# Patient Record
Sex: FEMALE | Race: WHITE | NOT HISPANIC OR LATINO | Employment: FULL TIME | URBAN - METROPOLITAN AREA
[De-identification: names, ages, dates, MRNs, and addresses within clinical notes are randomized per-mention and may not be internally consistent; named-entity substitution may affect disease eponyms.]

---

## 2023-11-01 ENCOUNTER — APPOINTMENT (OUTPATIENT)
Dept: PRIMARY CARE | Facility: CLINIC | Age: 48
End: 2023-11-01
Payer: COMMERCIAL

## 2023-11-06 RX ORDER — ATORVASTATIN CALCIUM 20 MG/1
20 TABLET, FILM COATED ORAL EVERY 24 HOURS
COMMUNITY
End: 2023-11-29 | Stop reason: SDUPTHER

## 2023-11-06 RX ORDER — CLOBETASOL PROPIONATE 0.5 MG/G
1 OINTMENT TOPICAL EVERY 12 HOURS
COMMUNITY
Start: 2022-10-26 | End: 2024-02-13

## 2023-11-06 RX ORDER — FLUOCINOLONE ACETONIDE 0.11 MG/ML
5 OIL AURICULAR (OTIC) EVERY 12 HOURS
COMMUNITY
End: 2023-11-14 | Stop reason: ALTCHOICE

## 2023-11-06 RX ORDER — ALPRAZOLAM 0.25 MG/1
0.25 TABLET ORAL 2 TIMES DAILY
COMMUNITY
Start: 2015-11-23 | End: 2024-05-20 | Stop reason: SDUPTHER

## 2023-11-06 RX ORDER — FLUTICASONE PROPIONATE 50 MCG
1 SPRAY, SUSPENSION (ML) NASAL EVERY 24 HOURS
COMMUNITY
Start: 2022-11-02 | End: 2023-11-14 | Stop reason: ALTCHOICE

## 2023-11-14 ENCOUNTER — OFFICE VISIT (OUTPATIENT)
Dept: PRIMARY CARE | Facility: CLINIC | Age: 48
End: 2023-11-14
Payer: COMMERCIAL

## 2023-11-14 ENCOUNTER — APPOINTMENT (OUTPATIENT)
Dept: PRIMARY CARE | Facility: CLINIC | Age: 48
End: 2023-11-14
Payer: COMMERCIAL

## 2023-11-14 VITALS
HEART RATE: 86 BPM | DIASTOLIC BLOOD PRESSURE: 64 MMHG | OXYGEN SATURATION: 94 % | SYSTOLIC BLOOD PRESSURE: 102 MMHG | BODY MASS INDEX: 36.99 KG/M2 | WEIGHT: 199 LBS

## 2023-11-14 DIAGNOSIS — B37.31 VAGINAL YEAST INFECTION: Primary | ICD-10-CM

## 2023-11-14 DIAGNOSIS — R73.9 HYPERGLYCEMIA: ICD-10-CM

## 2023-11-14 DIAGNOSIS — E78.2 MIXED HYPERLIPIDEMIA: ICD-10-CM

## 2023-11-14 PROBLEM — E78.5 DYSLIPIDEMIA: Status: ACTIVE | Noted: 2023-11-14

## 2023-11-14 PROBLEM — E66.9 OBESITY: Status: ACTIVE | Noted: 2023-11-14

## 2023-11-14 PROBLEM — E78.5 HYPERLIPIDEMIA: Status: ACTIVE | Noted: 2023-11-14

## 2023-11-14 PROBLEM — F17.200 TOBACCO USE DISORDER: Status: ACTIVE | Noted: 2023-11-14

## 2023-11-14 PROBLEM — R51.9 HEADACHE, CHRONIC DAILY: Status: ACTIVE | Noted: 2023-11-14

## 2023-11-14 PROBLEM — L40.9 PSORIASIS: Status: ACTIVE | Noted: 2023-11-14

## 2023-11-14 PROBLEM — R87.810 CERVICAL HIGH RISK HPV (HUMAN PAPILLOMAVIRUS) TEST POSITIVE: Status: ACTIVE | Noted: 2023-11-14

## 2023-11-14 PROBLEM — Z78.9 MEDICAL HOME PATIENT: Status: ACTIVE | Noted: 2023-11-14

## 2023-11-14 PROBLEM — M26.609 TEMPOROMANDIBULAR JOINT DISORDERS: Status: ACTIVE | Noted: 2023-11-14

## 2023-11-14 PROBLEM — G47.33 SLEEP APNEA, OBSTRUCTIVE: Status: ACTIVE | Noted: 2023-11-14

## 2023-11-14 PROBLEM — E55.9 VITAMIN D DEFICIENCY: Status: ACTIVE | Noted: 2023-11-14

## 2023-11-14 PROBLEM — F41.9 ANXIETY: Status: ACTIVE | Noted: 2023-11-14

## 2023-11-14 PROBLEM — F17.210 CIGARETTE SMOKER: Status: ACTIVE | Noted: 2023-11-14

## 2023-11-14 PROBLEM — K21.9 CHRONIC GERD: Status: ACTIVE | Noted: 2023-11-14

## 2023-11-14 PROBLEM — F43.21 GRIEF: Status: ACTIVE | Noted: 2023-11-14

## 2023-11-14 PROBLEM — E78.5 DYSLIPIDEMIA: Status: RESOLVED | Noted: 2023-11-14 | Resolved: 2023-11-14

## 2023-11-14 PROBLEM — M48.00 SPINAL STENOSIS: Status: ACTIVE | Noted: 2023-11-14

## 2023-11-14 PROCEDURE — 4004F PT TOBACCO SCREEN RCVD TLK: CPT | Performed by: INTERNAL MEDICINE

## 2023-11-14 PROCEDURE — 99214 OFFICE O/P EST MOD 30 MIN: CPT | Performed by: INTERNAL MEDICINE

## 2023-11-14 RX ORDER — TOPIRAMATE 50 MG/1
50 TABLET, FILM COATED ORAL NIGHTLY
COMMUNITY
Start: 2015-11-23 | End: 2023-11-14 | Stop reason: ALTCHOICE

## 2023-11-14 RX ORDER — FLUOCINOLONE ACETONIDE 0.11 MG/ML
OIL TOPICAL
COMMUNITY
Start: 2023-09-29 | End: 2024-05-20 | Stop reason: ALTCHOICE

## 2023-11-14 RX ORDER — FLUCONAZOLE 200 MG/1
200 TABLET ORAL DAILY
Qty: 3 TABLET | Refills: 0 | Status: SHIPPED | OUTPATIENT
Start: 2023-11-14 | End: 2023-11-17

## 2023-11-14 RX ORDER — GUSELKUMAB 100 MG/ML
100 INJECTION SUBCUTANEOUS
COMMUNITY
Start: 2023-08-29 | End: 2023-11-14 | Stop reason: ALTCHOICE

## 2023-11-14 ASSESSMENT — PAIN SCALES - GENERAL: PAINLEVEL: 0-NO PAIN

## 2023-11-14 ASSESSMENT — ENCOUNTER SYMPTOMS
DEPRESSION: 0
LOSS OF SENSATION IN FEET: 0
OCCASIONAL FEELINGS OF UNSTEADINESS: 0

## 2023-11-14 ASSESSMENT — PATIENT HEALTH QUESTIONNAIRE - PHQ9
2. FEELING DOWN, DEPRESSED OR HOPELESS: NOT AT ALL
1. LITTLE INTEREST OR PLEASURE IN DOING THINGS: NOT AT ALL
SUM OF ALL RESPONSES TO PHQ9 QUESTIONS 1 AND 2: 0

## 2023-11-14 NOTE — PROGRESS NOTES
Subjective   Patient ID: Svetlana Mercedes is a 47 y.o. female who presents for VAGINAL ISSUE / LAB ORDERS.    About a week red and irritated down there--not the first time it has happened.  Painful, no drainage, not aware of any lesions. Had been on 90 days of tremfia--off now. No h/o STD's.  Sexually active only w/ -he has no issues.  Only minor hot flashes currently--no menses due to ablation in 1999.           Objective   /64 (BP Location: Left arm, Patient Position: Sitting)   Pulse 86   Wt 90.3 kg (199 lb)   SpO2 94%   BMI 36.99 kg/m²     Physical Exam  Genitourinary:     Pubic Area: No rash.       Labia:         Right: No rash or lesion.         Left: No rash or lesion.       Comments: Vaginal walls red w/ adherant white plagues, so separate lesions  Lymphadenopathy:      Lower Body: No right inguinal adenopathy. No left inguinal adenopathy.         Assessment/Plan        Svetlana was seen today for vaginal issue / lab orders.  Diagnoses and all orders for this visit:  Vaginal yeast infection (Primary)  -     fluconazole (Diflucan) 200 mg tablet; Take 1 tablet (200 mg) by mouth once daily for 3 days.  Mixed hyperlipidemia  -     Comprehensive Metabolic Panel; Future  -     Lipid Panel; Future  Hyperglycemia  -     Hemoglobin A1C; Future

## 2023-11-20 ENCOUNTER — OFFICE VISIT (OUTPATIENT)
Dept: PRIMARY CARE | Facility: CLINIC | Age: 48
End: 2023-11-20
Payer: COMMERCIAL

## 2023-11-20 ENCOUNTER — APPOINTMENT (OUTPATIENT)
Dept: PRIMARY CARE | Facility: CLINIC | Age: 48
End: 2023-11-20
Payer: COMMERCIAL

## 2023-11-20 VITALS
SYSTOLIC BLOOD PRESSURE: 112 MMHG | DIASTOLIC BLOOD PRESSURE: 60 MMHG | HEART RATE: 72 BPM | OXYGEN SATURATION: 94 % | BODY MASS INDEX: 37.21 KG/M2 | WEIGHT: 200.2 LBS

## 2023-11-20 DIAGNOSIS — R05.2 SUBACUTE COUGH: ICD-10-CM

## 2023-11-20 DIAGNOSIS — H65.02 NON-RECURRENT ACUTE SEROUS OTITIS MEDIA OF LEFT EAR: Primary | ICD-10-CM

## 2023-11-20 PROBLEM — F17.210 CIGARETTE SMOKER: Status: RESOLVED | Noted: 2023-11-14 | Resolved: 2023-11-20

## 2023-11-20 PROBLEM — Z78.9 MEDICAL HOME PATIENT: Status: RESOLVED | Noted: 2023-11-14 | Resolved: 2023-11-20

## 2023-11-20 PROCEDURE — 4004F PT TOBACCO SCREEN RCVD TLK: CPT | Performed by: INTERNAL MEDICINE

## 2023-11-20 PROCEDURE — 99214 OFFICE O/P EST MOD 30 MIN: CPT | Performed by: INTERNAL MEDICINE

## 2023-11-20 RX ORDER — DOXYCYCLINE 100 MG/1
100 CAPSULE ORAL 2 TIMES DAILY
Qty: 14 CAPSULE | Refills: 0 | Status: SHIPPED | OUTPATIENT
Start: 2023-11-20 | End: 2023-11-29 | Stop reason: ALTCHOICE

## 2023-11-20 RX ORDER — BENZONATATE 200 MG/1
200 CAPSULE ORAL 3 TIMES DAILY PRN
Qty: 42 CAPSULE | Refills: 0 | Status: SHIPPED | OUTPATIENT
Start: 2023-11-20 | End: 2023-11-29 | Stop reason: ALTCHOICE

## 2023-11-20 RX ORDER — BUDESONIDE AND FORMOTEROL FUMARATE DIHYDRATE 160; 4.5 UG/1; UG/1
2 AEROSOL RESPIRATORY (INHALATION)
Qty: 1 EACH | Refills: 0 | Status: SHIPPED | OUTPATIENT
Start: 2023-11-20 | End: 2023-11-29 | Stop reason: ALTCHOICE

## 2023-11-20 ASSESSMENT — ENCOUNTER SYMPTOMS
SINUS PAIN: 1
COUGH: 1
WHEEZING: 0
HEADACHES: 0
RHINORRHEA: 1
DEPRESSION: 0
SWOLLEN GLANDS: 1
SORE THROAT: 1
LOSS OF SENSATION IN FEET: 0
OCCASIONAL FEELINGS OF UNSTEADINESS: 0

## 2023-11-20 ASSESSMENT — PAIN SCALES - GENERAL: PAINLEVEL: 0-NO PAIN

## 2023-11-20 NOTE — PROGRESS NOTES
Subjective   Patient ID: Svetlana Mercedes is a 47 y.o. female who presents for Nasal Congestion.    Tobacco use--continues to smoke     No chance of pregnancy    URI   This is a new (neg covid test at home) problem. The current episode started in the past 7 days. The problem has been unchanged. There has been no fever. Associated symptoms include congestion, coughing (present for a few weeks), ear pain, rhinorrhea, sinus pain, a sore throat and swollen glands. Pertinent negatives include no chest pain, headaches or wheezing. Associated symptoms comments: Denies shortness of breath or wheezing. She has tried acetaminophen and decongestant for the symptoms. The treatment provided mild relief.        Review of Systems   HENT:  Positive for congestion, ear pain, rhinorrhea, sinus pain and sore throat.    Respiratory:  Positive for cough (present for a few weeks). Negative for wheezing.    Cardiovascular:  Negative for chest pain.   Neurological:  Negative for headaches.       Objective   /60 (BP Location: Left arm, Patient Position: Sitting)   Pulse 72   Wt 90.8 kg (200 lb 3.2 oz)   SpO2 94%   BMI 37.21 kg/m²     Physical Exam  Constitutional:       General: She is not in acute distress.     Appearance: She is ill-appearing.   HENT:      Ears:      Comments: Bilat TM w/ fluid L red and bulgint     Nose: Congestion and rhinorrhea present.      Mouth/Throat:      Comments: PND  Cardiovascular:      Rate and Rhythm: Normal rate and regular rhythm.   Pulmonary:      Effort: No respiratory distress.      Breath sounds: Decreased air movement present. Wheezing (faint with forced expiration) and rhonchi present. No decreased breath sounds.   Lymphadenopathy:      Cervical: Cervical adenopathy present.         Assessment/Plan  advised to quit smoking and go to ER if shortness of breath worsens      Svetlana was seen today for nasal congestion.  Diagnoses and all orders for this visit:  Non-recurrent acute serous otitis  media of left ear (Primary)  -     doxycycline (Vibramycin) 100 mg capsule; Take 1 capsule (100 mg) by mouth 2 times a day for 10 days. Take with at least 8 ounces (large glass) of water, do not lie down for 30 minutes after  Subacute cough  -     benzonatate (Tessalon) 200 mg capsule; Take 1 capsule (200 mg) by mouth 3 times a day as needed for cough. Do not crush or chew.  -     budesonide-formoteroL (Symbicort) 160-4.5 mcg/actuation inhaler; Inhale 2 puffs 2 times a day. Rinse mouth with water after use to reduce aftertaste and incidence of candidiasis. Do not swallow.

## 2023-11-21 ENCOUNTER — APPOINTMENT (OUTPATIENT)
Dept: PRIMARY CARE | Facility: CLINIC | Age: 48
End: 2023-11-21
Payer: COMMERCIAL

## 2023-11-25 ENCOUNTER — LAB (OUTPATIENT)
Dept: LAB | Facility: LAB | Age: 48
End: 2023-11-25
Payer: COMMERCIAL

## 2023-11-25 DIAGNOSIS — R73.9 HYPERGLYCEMIA: ICD-10-CM

## 2023-11-25 DIAGNOSIS — E78.2 MIXED HYPERLIPIDEMIA: ICD-10-CM

## 2023-11-25 LAB
ALBUMIN SERPL BCP-MCNC: 4.2 G/DL (ref 3.4–5)
ALP SERPL-CCNC: 59 U/L (ref 33–110)
ALT SERPL W P-5'-P-CCNC: 20 U/L (ref 7–45)
ANION GAP SERPL CALC-SCNC: 15 MMOL/L (ref 10–20)
AST SERPL W P-5'-P-CCNC: 20 U/L (ref 9–39)
BILIRUB SERPL-MCNC: 0.5 MG/DL (ref 0–1.2)
BUN SERPL-MCNC: 11 MG/DL (ref 6–23)
CALCIUM SERPL-MCNC: 9.6 MG/DL (ref 8.6–10.6)
CHLORIDE SERPL-SCNC: 104 MMOL/L (ref 98–107)
CHOLEST SERPL-MCNC: 166 MG/DL (ref 0–199)
CHOLESTEROL/HDL RATIO: 5.3
CO2 SERPL-SCNC: 26 MMOL/L (ref 21–32)
CREAT SERPL-MCNC: 0.54 MG/DL (ref 0.5–1.05)
EST. AVERAGE GLUCOSE BLD GHB EST-MCNC: 131 MG/DL
GFR SERPL CREATININE-BSD FRML MDRD: >90 ML/MIN/1.73M*2
GLUCOSE SERPL-MCNC: 94 MG/DL (ref 74–99)
HBA1C MFR BLD: 6.2 %
HDLC SERPL-MCNC: 31.5 MG/DL
LDLC SERPL CALC-MCNC: ABNORMAL MG/DL
NON HDL CHOLESTEROL: 135 MG/DL (ref 0–149)
POTASSIUM SERPL-SCNC: 4.3 MMOL/L (ref 3.5–5.3)
PROT SERPL-MCNC: 6.6 G/DL (ref 6.4–8.2)
SODIUM SERPL-SCNC: 141 MMOL/L (ref 136–145)
TRIGL SERPL-MCNC: 472 MG/DL (ref 0–149)
VLDL: ABNORMAL

## 2023-11-25 PROCEDURE — 80061 LIPID PANEL: CPT

## 2023-11-25 PROCEDURE — 36415 COLL VENOUS BLD VENIPUNCTURE: CPT

## 2023-11-25 PROCEDURE — 83036 HEMOGLOBIN GLYCOSYLATED A1C: CPT

## 2023-11-25 PROCEDURE — 80053 COMPREHEN METABOLIC PANEL: CPT

## 2023-11-29 ENCOUNTER — OFFICE VISIT (OUTPATIENT)
Dept: PRIMARY CARE | Facility: CLINIC | Age: 48
End: 2023-11-29
Payer: COMMERCIAL

## 2023-11-29 VITALS
BODY MASS INDEX: 36.62 KG/M2 | DIASTOLIC BLOOD PRESSURE: 70 MMHG | HEART RATE: 84 BPM | SYSTOLIC BLOOD PRESSURE: 110 MMHG | WEIGHT: 197 LBS | OXYGEN SATURATION: 94 %

## 2023-11-29 DIAGNOSIS — R73.9 HYPERGLYCEMIA: ICD-10-CM

## 2023-11-29 DIAGNOSIS — M48.061 SPINAL STENOSIS OF LUMBAR REGION WITHOUT NEUROGENIC CLAUDICATION: ICD-10-CM

## 2023-11-29 DIAGNOSIS — Z00.00 WELL ADULT EXAM: Primary | ICD-10-CM

## 2023-11-29 DIAGNOSIS — K21.9 CHRONIC GERD: ICD-10-CM

## 2023-11-29 DIAGNOSIS — E78.2 MIXED HYPERLIPIDEMIA: ICD-10-CM

## 2023-11-29 DIAGNOSIS — G43.009 MIGRAINE WITHOUT AURA AND WITHOUT STATUS MIGRAINOSUS, NOT INTRACTABLE: ICD-10-CM

## 2023-11-29 DIAGNOSIS — F17.200 TOBACCO USE DISORDER: ICD-10-CM

## 2023-11-29 DIAGNOSIS — E55.9 VITAMIN D DEFICIENCY: ICD-10-CM

## 2023-11-29 DIAGNOSIS — L40.9 PSORIASIS: ICD-10-CM

## 2023-11-29 DIAGNOSIS — M26.609 TEMPOROMANDIBULAR JOINT DISORDERS: ICD-10-CM

## 2023-11-29 DIAGNOSIS — F41.9 ANXIETY: ICD-10-CM

## 2023-11-29 DIAGNOSIS — J30.1 SEASONAL ALLERGIC RHINITIS DUE TO POLLEN: ICD-10-CM

## 2023-11-29 PROBLEM — M47.816 LUMBAR SPONDYLOSIS: Status: ACTIVE | Noted: 2018-08-22

## 2023-11-29 PROBLEM — T22.211A SECOND DEGREE BURN OF RIGHT FOREARM: Status: RESOLVED | Noted: 2018-09-07 | Resolved: 2023-11-29

## 2023-11-29 PROBLEM — R51.9 HEADACHE, CHRONIC DAILY: Status: RESOLVED | Noted: 2023-11-14 | Resolved: 2023-11-29

## 2023-11-29 PROBLEM — G47.33 SLEEP APNEA, OBSTRUCTIVE: Status: RESOLVED | Noted: 2023-11-14 | Resolved: 2023-11-29

## 2023-11-29 PROBLEM — T22.211A SECOND DEGREE BURN OF RIGHT FOREARM: Status: ACTIVE | Noted: 2018-09-07

## 2023-11-29 PROBLEM — F43.21 GRIEF: Status: RESOLVED | Noted: 2023-11-14 | Resolved: 2023-11-29

## 2023-11-29 PROCEDURE — 99396 PREV VISIT EST AGE 40-64: CPT | Performed by: INTERNAL MEDICINE

## 2023-11-29 PROCEDURE — 4004F PT TOBACCO SCREEN RCVD TLK: CPT | Performed by: INTERNAL MEDICINE

## 2023-11-29 RX ORDER — ATORVASTATIN CALCIUM 20 MG/1
20 TABLET, FILM COATED ORAL EVERY 24 HOURS
Qty: 90 TABLET | Refills: 3 | Status: SHIPPED | OUTPATIENT
Start: 2023-11-29

## 2023-11-29 ASSESSMENT — ENCOUNTER SYMPTOMS
VOMITING: 0
ABDOMINAL PAIN: 0
JOINT SWELLING: 0
POLYDIPSIA: 0
ADENOPATHY: 1
NAUSEA: 0
FREQUENCY: 0
EYE PAIN: 0
NUMBNESS: 0
SORE THROAT: 0
COLOR CHANGE: 0
DYSURIA: 0
SHORTNESS OF BREATH: 0
CHEST TIGHTNESS: 0
FEVER: 0
PSYCHIATRIC NEGATIVE: 1
COUGH: 1
ARTHRALGIAS: 0
CONSTIPATION: 0
PALPITATIONS: 0
DIZZINESS: 0
DIARRHEA: 0
FATIGUE: 0
ACTIVITY CHANGE: 0
CHILLS: 0
HEADACHES: 0

## 2023-11-29 ASSESSMENT — PAIN SCALES - GENERAL: PAINLEVEL: 0-NO PAIN

## 2023-11-29 NOTE — PROGRESS NOTES
Subjective   Patient ID: Svetlana Mercedes is a 47 y.o. female who presents for ann exam Annual Exam.    Hyperlipidemia-stable and compliant on meds. Taking atorvastatin.       Anxiety--stable on meds -uses alprazolam as needed--usually 1/2 a pill  for few days in a week then may not use again for month or so    Hyperglycemia-last A1c  5.7  11/2023    Migraines rare at this point    Tobacco use--continues to smoke --1 ppd.  Thinks too much stress to try quitting.     Seen in  last Friday because ear still hurting--given a course of augmentin. Cough is steadily improving         Review of Systems   Constitutional:  Negative for activity change, chills, fatigue and fever.   HENT:  Positive for ear pain (improving). Negative for hearing loss and sore throat.    Eyes:  Negative for pain and visual disturbance.   Respiratory:  Positive for cough (improving). Negative for chest tightness and shortness of breath.    Cardiovascular:  Negative for chest pain, palpitations and leg swelling.   Gastrointestinal:  Negative for abdominal pain, constipation, diarrhea, nausea and vomiting.   Endocrine: Negative for polydipsia and polyuria.   Genitourinary:  Negative for dysuria and frequency.   Musculoskeletal:  Negative for arthralgias and joint swelling.   Skin:  Negative for color change and rash.        Cream and oil keeps things stable but not resolved-insurance wouldn't cover laser   Neurological:  Negative for dizziness, numbness and headaches.   Hematological:  Positive for adenopathy.   Psychiatric/Behavioral: Negative.          Always stressed       Objective   /70   Pulse 84   Wt 89.4 kg (197 lb)   SpO2 94%   BMI 36.62 kg/m²     Physical Exam  Constitutional:       General: She is not in acute distress.     Appearance: Normal appearance.   HENT:      Right Ear: There is no impacted cerumen.      Left Ear: There is no impacted cerumen.      Ears:      Comments: Bilat fluid-no redness     Nose: Nose  normal.      Mouth/Throat:      Pharynx: Oropharynx is clear.   Eyes:      Extraocular Movements: Extraocular movements intact.      Pupils: Pupils are equal, round, and reactive to light.   Neck:      Vascular: No carotid bruit.   Cardiovascular:      Rate and Rhythm: Normal rate and regular rhythm.      Heart sounds: Normal heart sounds. No murmur heard.     No gallop.   Pulmonary:      Breath sounds: Normal breath sounds. No wheezing, rhonchi or rales.   Abdominal:      General: Abdomen is flat. Bowel sounds are normal.      Palpations: Abdomen is soft. There is no mass.      Tenderness: There is no abdominal tenderness.   Genitourinary:     Comments: Defer to gyn   Musculoskeletal:         General: Normal range of motion.   Skin:     General: Skin is warm and dry.      Findings: Rash present.      Comments: Defer to full exam to derm   Neurological:      General: No focal deficit present.      Mental Status: She is alert and oriented to person, place, and time.   Psychiatric:         Mood and Affect: Mood normal.         Assessment/Plan  continue regimen-declines meds for quitting smoking       Svetlana was seen today for annual exam.  Diagnoses and all orders for this visit:  Well adult exam (Primary)  Mixed hyperlipidemia  -     atorvastatin (Lipitor) 20 mg tablet; Take 1 tablet (20 mg) by mouth once every 24 hours.  Temporomandibular joint disorders  Seasonal allergic rhinitis due to pollen  Hyperglycemia  Vitamin D deficiency  Chronic GERD  Anxiety  Spinal stenosis of lumbar region without neurogenic claudication  Migraine without aura and without status migrainosus, not intractable  Tobacco use disorder  Psoriasis  Other orders  -     Follow Up In Primary Care - Health Maintenance; Future

## 2024-01-23 ENCOUNTER — OFFICE VISIT (OUTPATIENT)
Dept: PRIMARY CARE | Facility: CLINIC | Age: 49
End: 2024-01-23
Payer: COMMERCIAL

## 2024-01-23 VITALS
OXYGEN SATURATION: 97 % | DIASTOLIC BLOOD PRESSURE: 64 MMHG | SYSTOLIC BLOOD PRESSURE: 110 MMHG | WEIGHT: 200.2 LBS | HEART RATE: 70 BPM | BODY MASS INDEX: 37.21 KG/M2

## 2024-01-23 DIAGNOSIS — S39.012A BACK STRAIN, INITIAL ENCOUNTER: Primary | ICD-10-CM

## 2024-01-23 PROCEDURE — 4004F PT TOBACCO SCREEN RCVD TLK: CPT | Performed by: INTERNAL MEDICINE

## 2024-01-23 PROCEDURE — 99214 OFFICE O/P EST MOD 30 MIN: CPT | Performed by: INTERNAL MEDICINE

## 2024-01-23 RX ORDER — PREDNISONE 10 MG/1
10 TABLET ORAL DAILY
Qty: 7 TABLET | Refills: 0 | Status: SHIPPED | OUTPATIENT
Start: 2024-01-23 | End: 2024-01-30

## 2024-01-23 RX ORDER — CYCLOBENZAPRINE HCL 10 MG
10 TABLET ORAL NIGHTLY PRN
Qty: 10 TABLET | Refills: 0 | Status: SHIPPED | OUTPATIENT
Start: 2024-01-23 | End: 2024-05-20 | Stop reason: ALTCHOICE

## 2024-01-23 ASSESSMENT — ENCOUNTER SYMPTOMS
LOSS OF SENSATION IN FEET: 0
FEVER: 0
BACK PAIN: 1
OCCASIONAL FEELINGS OF UNSTEADINESS: 0
CHILLS: 0
DEPRESSION: 0
WEAKNESS: 0
NUMBNESS: 0

## 2024-01-23 ASSESSMENT — PATIENT HEALTH QUESTIONNAIRE - PHQ9
1. LITTLE INTEREST OR PLEASURE IN DOING THINGS: NOT AT ALL
2. FEELING DOWN, DEPRESSED OR HOPELESS: NOT AT ALL
SUM OF ALL RESPONSES TO PHQ9 QUESTIONS 1 AND 2: 0

## 2024-01-23 ASSESSMENT — PAIN SCALES - GENERAL: PAINLEVEL: 8

## 2024-01-23 NOTE — PROGRESS NOTES
Subjective   Patient ID: Svetlana Mercedes is a 48 y.o. female who presents for Back Pain.    Lower Right and middle back hurting. Went to massage therapist which really helped who told he it felt more muscular.  Going on for at least 3 weeks. After 5 hrs or so will wake w/ such pain she has to get up--mattress is new last year but feels too hard--has been trying different mattress toppers. Only taking tylenol because nsaid's upset her stomach. Has tried both heat and ice-neither stand out. No clear injury but shoveling definitely made it worse. No radiation into legs. Tried exercises for pinched nerve but aren't helping.          Review of Systems   Constitutional:  Negative for chills and fever.   Musculoskeletal:  Positive for back pain.   Skin:  Negative for rash.   Neurological:  Negative for weakness and numbness.       Objective   /64 (BP Location: Left arm, Patient Position: Sitting)   Pulse 70   Wt 90.8 kg (200 lb 3.2 oz)   SpO2 97%   BMI 37.21 kg/m²     Physical Exam  Musculoskeletal:      Thoracic back: Spasms and tenderness (muscular not spine) present. No swelling, deformity or bony tenderness.   Skin:     Findings: No rash.         Assessment/Plan   Diagnoses and all orders for this visit:  Back strain, initial encounter  -     predniSONE (Deltasone) 10 mg tablet; Take 1 tablet (10 mg) by mouth once daily for 7 days.  -     cyclobenzaprine (Flexeril) 10 mg tablet; Take 1 tablet (10 mg) by mouth as needed at bedtime for muscle spasms for up to 10 days.    Advise heat and gentle activity

## 2024-02-12 DIAGNOSIS — L40.9 PSORIASIS: Primary | ICD-10-CM

## 2024-02-13 RX ORDER — CLOBETASOL PROPIONATE 0.5 MG/G
OINTMENT TOPICAL
Qty: 60 G | Refills: 0 | Status: SHIPPED | OUTPATIENT
Start: 2024-02-13

## 2024-04-03 ENCOUNTER — TELEPHONE (OUTPATIENT)
Dept: PRIMARY CARE | Facility: CLINIC | Age: 49
End: 2024-04-03
Payer: COMMERCIAL

## 2024-04-03 NOTE — TELEPHONE ENCOUNTER
Patient started Medrol dose pack ( prescribed by ortho ) on Saturday. Developed some chest tightness yesterday and dry cough. Chest tightness has subsided, but still has dry cough. Inquired if anything needs prescribed or if she should stop her doses ( has 1 dose today and tomorrow. ) Please advise.

## 2024-04-10 ENCOUNTER — OFFICE VISIT (OUTPATIENT)
Dept: PRIMARY CARE | Facility: CLINIC | Age: 49
End: 2024-04-10
Payer: COMMERCIAL

## 2024-04-10 VITALS
SYSTOLIC BLOOD PRESSURE: 120 MMHG | WEIGHT: 196 LBS | HEART RATE: 94 BPM | OXYGEN SATURATION: 95 % | BODY MASS INDEX: 36.43 KG/M2 | DIASTOLIC BLOOD PRESSURE: 70 MMHG

## 2024-04-10 DIAGNOSIS — F17.200 TOBACCO USE DISORDER: ICD-10-CM

## 2024-04-10 DIAGNOSIS — H66.93 BILATERAL ACUTE OTITIS MEDIA: Primary | ICD-10-CM

## 2024-04-10 PROCEDURE — 4004F PT TOBACCO SCREEN RCVD TLK: CPT | Performed by: INTERNAL MEDICINE

## 2024-04-10 PROCEDURE — 99213 OFFICE O/P EST LOW 20 MIN: CPT | Performed by: INTERNAL MEDICINE

## 2024-04-10 ASSESSMENT — ENCOUNTER SYMPTOMS
DEPRESSION: 0
COUGH: 1
OCCASIONAL FEELINGS OF UNSTEADINESS: 0
WHEEZING: 1
LOSS OF SENSATION IN FEET: 0
SINUS PAIN: 1

## 2024-04-10 ASSESSMENT — PAIN SCALES - GENERAL: PAINLEVEL: 0-NO PAIN

## 2024-04-10 ASSESSMENT — COLUMBIA-SUICIDE SEVERITY RATING SCALE - C-SSRS
1. IN THE PAST MONTH, HAVE YOU WISHED YOU WERE DEAD OR WISHED YOU COULD GO TO SLEEP AND NOT WAKE UP?: NO
6. HAVE YOU EVER DONE ANYTHING, STARTED TO DO ANYTHING, OR PREPARED TO DO ANYTHING TO END YOUR LIFE?: NO
2. HAVE YOU ACTUALLY HAD ANY THOUGHTS OF KILLING YOURSELF?: NO

## 2024-04-10 NOTE — LETTER
April 10, 2024     Patient: Svetlana Mercedes   YOB: 1975   Date of Visit: 4/10/2024       To Whom It May Concern:    Svetlana Mercedes was seen in my clinic on 4/10/2024 at 3:15 pm. Please excuse Svetlana for her absence from work on this day to make the appointment.  May return to work Friday 4/12/2024.    If you have any questions or concerns, please don't hesitate to call.         Sincerely,         Daphnie Barahona MD

## 2024-04-10 NOTE — PROGRESS NOTES
Subjective   Patient ID: Svetlana Mercedes is a 48 y.o. female who presents for Otitis Media.    Went to  4 days ago and was told had pneumonia--gave her augmentin which she finished today and zpak which she hasn't started yet. Cough is better but ears are bad and getting more dizzy    Still smoking    URI   This is a new problem. Episode onset: 8 days ago. The maximum temperature recorded prior to her arrival was 101 - 101.9 F. Associated symptoms include congestion, coughing, ear pain, sinus pain and wheezing. The treatment provided mild relief.        Review of Systems   HENT:  Positive for congestion, ear pain and sinus pain.    Respiratory:  Positive for cough and wheezing.        Objective   /70   Pulse 94   Wt 88.9 kg (196 lb)   SpO2 95%   BMI 36.43 kg/m²     Physical Exam  Constitutional:       General: She is not in acute distress.     Appearance: She is not ill-appearing.      Comments: Smells strongly of smoke   HENT:      Ears:      Comments: TM-bilat fluid with mild redness     Nose: Congestion present.      Comments: Maxillary sinus tenderness     Mouth/Throat:      Pharynx: Oropharynx is clear.   Cardiovascular:      Rate and Rhythm: Normal rate and regular rhythm.   Pulmonary:      Breath sounds: Decreased air movement present. Decreased breath sounds present. No wheezing, rhonchi or rales.   Lymphadenopathy:      Cervical: Cervical adenopathy present.         Assessment/Plan   Diagnoses and all orders for this visit:  Bilateral acute otitis media  Tobacco use disorder    Advise starting the zithromax and flonase previously prescribed. Again strongly advised she stop smoking

## 2024-04-23 ENCOUNTER — APPOINTMENT (OUTPATIENT)
Dept: RADIOLOGY | Facility: CLINIC | Age: 49
End: 2024-04-23
Payer: COMMERCIAL

## 2024-05-07 ENCOUNTER — HOSPITAL ENCOUNTER (OUTPATIENT)
Dept: RADIOLOGY | Facility: CLINIC | Age: 49
Discharge: HOME | End: 2024-05-07
Payer: COMMERCIAL

## 2024-05-07 DIAGNOSIS — M54.50 LOW BACK PAIN, UNSPECIFIED: ICD-10-CM

## 2024-05-07 PROCEDURE — 72148 MRI LUMBAR SPINE W/O DYE: CPT

## 2024-05-07 PROCEDURE — 72148 MRI LUMBAR SPINE W/O DYE: CPT | Performed by: RADIOLOGY

## 2024-05-08 ENCOUNTER — TELEPHONE (OUTPATIENT)
Dept: PRIMARY CARE | Facility: CLINIC | Age: 49
End: 2024-05-08
Payer: COMMERCIAL

## 2024-05-08 NOTE — TELEPHONE ENCOUNTER
Patient had MRI of spine on 4/5/24. Would like [facility] to review results and advise on any further tx or follow ups with any specialists. Please advise.     On review of MRI--sl worse disk diease but no stenosis

## 2024-05-10 ENCOUNTER — TELEPHONE (OUTPATIENT)
Dept: PRIMARY CARE | Facility: CLINIC | Age: 49
End: 2024-05-10
Payer: COMMERCIAL

## 2024-05-10 NOTE — TELEPHONE ENCOUNTER
Patient stated liver cyst was found on MRI of spine. Recommendation for US study was on report. Patient inquired if she should have US liver ordered. Please advise.

## 2024-05-14 DIAGNOSIS — K76.89 LIVER CYST: ICD-10-CM

## 2024-05-20 ENCOUNTER — OFFICE VISIT (OUTPATIENT)
Dept: PRIMARY CARE | Facility: CLINIC | Age: 49
End: 2024-05-20
Payer: COMMERCIAL

## 2024-05-20 VITALS
BODY MASS INDEX: 35.95 KG/M2 | SYSTOLIC BLOOD PRESSURE: 110 MMHG | DIASTOLIC BLOOD PRESSURE: 68 MMHG | HEART RATE: 78 BPM | OXYGEN SATURATION: 97 % | WEIGHT: 193.4 LBS

## 2024-05-20 DIAGNOSIS — F41.9 ANXIETY: Primary | ICD-10-CM

## 2024-05-20 DIAGNOSIS — Z12.11 ENCOUNTER FOR SCREENING FOR MALIGNANT NEOPLASM OF COLON: ICD-10-CM

## 2024-05-20 PROCEDURE — 99214 OFFICE O/P EST MOD 30 MIN: CPT | Performed by: INTERNAL MEDICINE

## 2024-05-20 PROCEDURE — 4004F PT TOBACCO SCREEN RCVD TLK: CPT | Performed by: INTERNAL MEDICINE

## 2024-05-20 RX ORDER — ALPRAZOLAM 0.25 MG/1
0.25 TABLET ORAL 2 TIMES DAILY
Qty: 30 TABLET | Refills: 0 | Status: SHIPPED | OUTPATIENT
Start: 2024-05-20

## 2024-05-20 ASSESSMENT — PAIN SCALES - GENERAL: PAINLEVEL: 0-NO PAIN

## 2024-05-20 ASSESSMENT — PATIENT HEALTH QUESTIONNAIRE - PHQ9
SUM OF ALL RESPONSES TO PHQ9 QUESTIONS 1 AND 2: 0
2. FEELING DOWN, DEPRESSED OR HOPELESS: NOT AT ALL
1. LITTLE INTEREST OR PLEASURE IN DOING THINGS: NOT AT ALL

## 2024-05-20 ASSESSMENT — ENCOUNTER SYMPTOMS
OCCASIONAL FEELINGS OF UNSTEADINESS: 0
LOSS OF SENSATION IN FEET: 0
DEPRESSION: 0

## 2024-05-20 NOTE — PROGRESS NOTES
Subjective   Patient ID: Svetlana Mercedes is a 48 y.o. female who presents for [place] paperwork.    FMLA paperwork for anxiety and depression related to son's death-eg important dates (birthday, death date). Otherwise doing fine    Anxiety-has xanax-may not need for several mths then will have a bad wave and need it for few weeks(usually 1/2 tab once a day couple times a week) . Reviewed oarrs-last fill Aug 2023. Completed [employer] agreement on paper 5/20/2024.    Tobacco use-currently about 1ppd--thinks it helps anxiety    Mammo scheduled for next month             Objective   /68 (BP Location: Left arm, Patient Position: Sitting)   Pulse 78   Wt 87.7 kg (193 lb 6.4 oz)   SpO2 97%   BMI 35.95 kg/m²         Assessment/Plan   Diagnoses and all orders for this visit:  Anxiety  -     ALPRAZolam (Xanax) 0.25 mg tablet; Take 1 tablet (0.25 mg) by mouth 2 times a day. Take 1/2 to one whole tablet twice daily  Encounter for screening for malignant neoplasm of colon  -     Cologuard® colon cancer screening; Future

## 2024-05-20 NOTE — LETTER
November 8, 2024     Svetlana Mercedes  [address]  [place] [address]      Dear Ms. LevyDaren:    Below are the results from your recent visit:  Cologuard negative-recheck in 3 years     Resulted Orders   Cologuard® colon cancer screening   Result Value Ref Range    NONINV COLON CA DNA+OCC BLD SCRN STL QL Negative Negative      Comment:        NEGATIVE TEST RESULT. A negative Cologuard result indicates a low likelihood that a colorectal cancer (CRC) or advanced adenoma (adenomatous polyps with more advanced pre-malignant features)  is present. The chance that a person with a negative Cologuard test has a colorectal cancer is less than 1 in 1500 (negative predictive value >99.9%) or has an  advanced adenoma is less than  5.3% (negative predictive value 94.7%). These data are based on a prospective cross-sectional study of 10,000 individuals at average risk for colorectal cancer who were screened with both Cologuard and colonoscopy. (Grover QUEVEDO et al, N Engl J Med 2014;370(14):1770-0825) The normal value (reference range) for this assay is negative.    COLOGUARD RE-SCREENING RECOMMENDATION: Periodic colorectal cancer screening is an important part of preventive healthcare for asymptomatic individuals at average risk for colorectal cancer.  Following a negative Cologuard result, the American Cancer Society and U.S.  Multi-Society Task Force screening guidelines recommend a Cologuard re-screening interval of 3 years.   References: American Cancer Society Guideline for Colorectal Cancer Screening: https://www.cancer.org/cancer/colon-rectal-cancer/dzmxxpjca-cipsmkqbz-uknttqv/acs-recommendations.html.; Chidi SOMERS, Amanda LYNN, Matthew CARBAJALK, Colorectal Cancer Screening: Recommendations for Physicians and Patients from the U.S. Multi-Society Task Force on Colorectal Cancer Screening , Am J Gastroenterology 2017; 112:1047-6391.    TEST DESCRIPTION: Composite algorithmic analysis of stool DNA-biomarkers with hemoglobin immunoassay.    Quantitative values of individual biomarkers are not reportable and are not associated with individual biomarker result reference ranges. Cologuard is intended for colorectal cancer screening of adults of either sex, 45 years or older, who are at average-risk for colorectal cancer (CRC). Cologuard has been approved for use by the U.S. FDA. The performance of Cologuard was  established in a cross sectional study of average-risk adults aged 50-84. Cologuard performance in patients ages 45 to 49 years was estimated by sub-group analysis of near-age groups. Colonoscopies performed for a positive result may find as the most clinically significant lesion: colorectal cancer [4.0%], advanced adenoma (including sessile serrated polyps greater than or equal to 1cm diameter) [20%] or non- advanced adenoma [31%]; or no colorectal neoplasia [45%]. These estimates are derived from a prospective cross-sectional screening study of 10,000 individuals at average risk for colorectal cancer who were screened with both Cologuard and colonoscopy. (Grover QUEVEDO et al, N Engl J Med 2014;370(14):0086-5883.) Cologuard may produce a false negative or false positive result (no colorectal cancer or precancerous polyp present at colonoscopy follow up). A negative Cologuard test result does not guarantee the absence of CRC or advanced adenoma (pre-cancer). The current Cologuard  screening interval is every 3 years. (American Cancer Society and U.S. Multi-Society Task Force). Cologuard performance data in a 10,000 patient pivotal study using colonoscopy as the reference method can be accessed at the following location: www.[employer]/results. Additional description of the Cologuard test process, warnings and precautions can be found at www.[employer].com.       The test results show that your current treatment is working. Please continue your current medication and plan. We recommend that you repeat the above test(s) in 3 years.    If you have  any questions or concerns, please don't hesitate to call.         Sincerely,    Daphnie Barahona MD

## 2024-09-19 ENCOUNTER — APPOINTMENT (OUTPATIENT)
Dept: RADIOLOGY | Facility: CLINIC | Age: 49
End: 2024-09-19
Payer: COMMERCIAL

## 2024-09-27 ENCOUNTER — APPOINTMENT (OUTPATIENT)
Dept: RADIOLOGY | Facility: HOSPITAL | Age: 49
End: 2024-09-27
Payer: COMMERCIAL

## 2024-09-27 ENCOUNTER — HOSPITAL ENCOUNTER (OUTPATIENT)
Dept: RADIOLOGY | Facility: CLINIC | Age: 49
Discharge: HOME | End: 2024-09-27
Payer: COMMERCIAL

## 2024-09-27 DIAGNOSIS — K76.89 LIVER CYST: ICD-10-CM

## 2024-09-27 PROCEDURE — 76705 ECHO EXAM OF ABDOMEN: CPT

## 2024-10-08 ENCOUNTER — APPOINTMENT (OUTPATIENT)
Dept: PHYSICAL THERAPY | Facility: CLINIC | Age: 49
End: 2024-10-08
Payer: COMMERCIAL

## 2024-10-11 ENCOUNTER — APPOINTMENT (OUTPATIENT)
Dept: PRIMARY CARE | Facility: CLINIC | Age: 49
End: 2024-10-11
Payer: COMMERCIAL

## 2024-11-04 ENCOUNTER — APPOINTMENT (OUTPATIENT)
Dept: PRIMARY CARE | Facility: CLINIC | Age: 49
End: 2024-11-04
Payer: COMMERCIAL

## 2024-11-04 ENCOUNTER — OFFICE VISIT (OUTPATIENT)
Dept: PRIMARY CARE | Facility: CLINIC | Age: 49
End: 2024-11-04
Payer: COMMERCIAL

## 2024-11-04 VITALS
HEIGHT: 62 IN | BODY MASS INDEX: 37.36 KG/M2 | HEART RATE: 84 BPM | DIASTOLIC BLOOD PRESSURE: 66 MMHG | SYSTOLIC BLOOD PRESSURE: 104 MMHG | WEIGHT: 203 LBS | OXYGEN SATURATION: 96 %

## 2024-11-04 DIAGNOSIS — Z00.00 WELL ADULT EXAM: Primary | ICD-10-CM

## 2024-11-04 DIAGNOSIS — E55.9 VITAMIN D DEFICIENCY: ICD-10-CM

## 2024-11-04 DIAGNOSIS — E78.2 MIXED HYPERLIPIDEMIA: ICD-10-CM

## 2024-11-04 DIAGNOSIS — F41.9 ANXIETY: ICD-10-CM

## 2024-11-04 DIAGNOSIS — K76.0 NAFL (NONALCOHOLIC FATTY LIVER): ICD-10-CM

## 2024-11-04 DIAGNOSIS — R73.9 HYPERGLYCEMIA: ICD-10-CM

## 2024-11-04 DIAGNOSIS — F17.200 TOBACCO USE DISORDER: ICD-10-CM

## 2024-11-04 PROBLEM — K21.9 GASTROESOPHAGEAL REFLUX DISEASE: Status: RESOLVED | Noted: 2023-11-14 | Resolved: 2024-11-04

## 2024-11-04 PROCEDURE — 3008F BODY MASS INDEX DOCD: CPT | Performed by: INTERNAL MEDICINE

## 2024-11-04 PROCEDURE — 99396 PREV VISIT EST AGE 40-64: CPT | Performed by: INTERNAL MEDICINE

## 2024-11-04 PROCEDURE — 4004F PT TOBACCO SCREEN RCVD TLK: CPT | Performed by: INTERNAL MEDICINE

## 2024-11-04 RX ORDER — CARISOPRODOL 350 MG/1
350 TABLET ORAL NIGHTLY
COMMUNITY
Start: 2024-03-18 | End: 2024-11-04 | Stop reason: ALTCHOICE

## 2024-11-04 RX ORDER — FLUTICASONE PROPIONATE 50 MCG
1 SPRAY, SUSPENSION (ML) NASAL
COMMUNITY
Start: 2024-04-05 | End: 2024-11-04 | Stop reason: ALTCHOICE

## 2024-11-04 ASSESSMENT — ENCOUNTER SYMPTOMS
EYE PAIN: 0
CHEST TIGHTNESS: 0
OCCASIONAL FEELINGS OF UNSTEADINESS: 0
JOINT SWELLING: 0
DIARRHEA: 0
COUGH: 0
ARTHRALGIAS: 0
DYSURIA: 0
FEVER: 0
PSYCHIATRIC NEGATIVE: 1
COLOR CHANGE: 0
VOMITING: 0
CHILLS: 0
DIZZINESS: 0
ACTIVITY CHANGE: 0
FATIGUE: 0
ABDOMINAL PAIN: 0
CONSTIPATION: 0
HEADACHES: 0
NUMBNESS: 0
DEPRESSION: 0
NAUSEA: 0
FREQUENCY: 0
PALPITATIONS: 0
LOSS OF SENSATION IN FEET: 0
POLYDIPSIA: 0
SORE THROAT: 0

## 2024-11-04 ASSESSMENT — COLUMBIA-SUICIDE SEVERITY RATING SCALE - C-SSRS
6. HAVE YOU EVER DONE ANYTHING, STARTED TO DO ANYTHING, OR PREPARED TO DO ANYTHING TO END YOUR LIFE?: NO
2. HAVE YOU ACTUALLY HAD ANY THOUGHTS OF KILLING YOURSELF?: NO
1. IN THE PAST MONTH, HAVE YOU WISHED YOU WERE DEAD OR WISHED YOU COULD GO TO SLEEP AND NOT WAKE UP?: NO

## 2024-11-04 ASSESSMENT — PAIN SCALES - GENERAL: PAINLEVEL_OUTOF10: 0-NO PAIN

## 2024-11-04 NOTE — PROGRESS NOTES
Subjective   Patient ID: Svetlana Mercedes is a 48 y.o. female who presents for Annual Exam.    Hyperlipidemia-stable and compliant on meds. Taking atorvastatin.  Lab Results       Component                Value               Date                       LDLCALC                                      11/25/2023              Comment:    The calculation of LDL and VLDL are inaccurate when the Triglycerides are greater than 400 mg/dL or when the patient is     Anxiety--stable on meds -uses alprazolam as needed--usually 1/2 a pill  for few days in a week then may not use again for month or so. Currently still at only a few even with cancer stress. E consent signed 5/2024    Hyperglycemia-Lab Results       Component                Value               Date                                                5.7 2023                HGBA1C                   6.4 (H)             11/01/2024               Migraines rare at this point    Breast cancer-dx 9/2024-stage 0 in duct area, MRI found another spot but that was benign-scheduled for lumpectomy next week    Fatty liver-on  2024    Tobacco use--quit smoking due to breast cancer diagnosis--day 24! [facility] [employer] [facility].     Diet-not watching at all with breast cancer dx    Just sent cologuard back last week             Review of Systems   Constitutional:  Negative for activity change, chills, fatigue and fever.   HENT:  Negative for hearing loss and sore throat.    Eyes:  Negative for pain and visual disturbance.   Respiratory:  Negative for cough and chest tightness.    Cardiovascular:  Negative for chest pain, palpitations and leg swelling.   Gastrointestinal:  Negative for abdominal pain, constipation, diarrhea, nausea and vomiting.   Endocrine: Negative for polydipsia and polyuria.   Genitourinary:  Negative for dysuria and frequency.   Musculoskeletal:  Negative for arthralgias and joint swelling.   Skin:  Negative for color change and rash.        Cream and oil  "keeps things stable but not resolved-insurance wouldn't cover laser   Neurological:  Negative for dizziness, numbness and headaches.   Psychiatric/Behavioral: Negative.          Always stressed       Objective   /66   Pulse 84   Ht 1.575 m (5' 2\")   Wt 92.1 kg (203 lb)   SpO2 96%   BMI 37.13 kg/m²     Physical Exam  Constitutional:       General: She is not in acute distress.     Appearance: Normal appearance.   HENT:      Right Ear: Tympanic membrane normal. There is no impacted cerumen.      Left Ear: Tympanic membrane normal. There is no impacted cerumen.      Nose: Nose normal.      Mouth/Throat:      Pharynx: Oropharynx is clear.   Eyes:      Extraocular Movements: Extraocular movements intact.      Pupils: Pupils are equal, round, and reactive to light.   Neck:      Thyroid: No thyromegaly.      Vascular: No carotid bruit.   Cardiovascular:      Rate and Rhythm: Normal rate and regular rhythm.      Heart sounds: Normal heart sounds. No murmur heard.     No gallop.   Pulmonary:      Breath sounds: Normal breath sounds. No wheezing, rhonchi or rales.   Abdominal:      General: Abdomen is flat. Bowel sounds are normal.      Palpations: Abdomen is soft. There is no mass.      Tenderness: There is no abdominal tenderness.   Genitourinary:     Comments: Defer to gyn   Musculoskeletal:         General: Normal range of motion.   Skin:     General: Skin is warm and dry.      Findings: Rash present.      Comments: Defer to full exam to derm   Neurological:      General: No focal deficit present.      Mental Status: She is alert and oriented to person, place, and time.   Psychiatric:         Mood and Affect: Mood normal.         Assessment/Plan  will follow for pre-diabetes after surg; declines flu shot; continue rest of regimen  Diagnoses and all orders for this visit:  Well adult exam  Mixed hyperlipidemia  -     Lipid Panel; Future  Vitamin D deficiency  Hyperglycemia  Anxiety  Tobacco use disorder  NAFL " (nonalcoholic fatty liver)      Current Outpatient Medications:     ALPRAZolam (Xanax) 0.25 mg tablet, Take 1 tablet (0.25 mg) by mouth 2 times a day. Take 1/2 to one whole tablet twice daily, Disp: 30 tablet, Rfl: 0    atorvastatin (Lipitor) 20 mg tablet, Take 1 tablet (20 mg) by mouth once every 24 hours., Disp: 90 tablet, Rfl: 3    clobetasol (Temovate) 0.05 % ointment, apply externally twice a day, Disp: 60 g, Rfl: 0    ASCORBIC ACID, VITAMIN C, ORAL, Take by mouth. (Patient not taking: Reported on 11/4/2024), Disp: , Rfl:

## 2024-11-08 LAB — NONINV COLON CA DNA+OCC BLD SCRN STL QL: NEGATIVE

## 2024-11-09 ENCOUNTER — LAB (OUTPATIENT)
Dept: LAB | Facility: LAB | Age: 49
End: 2024-11-09
Payer: COMMERCIAL

## 2024-11-09 DIAGNOSIS — E78.2 MIXED HYPERLIPIDEMIA: ICD-10-CM

## 2024-11-09 LAB
CHOLEST SERPL-MCNC: 168 MG/DL (ref 0–199)
CHOLESTEROL/HDL RATIO: 4.5
HDLC SERPL-MCNC: 37.1 MG/DL
LDLC SERPL CALC-MCNC: 75 MG/DL
NON HDL CHOLESTEROL: 131 MG/DL (ref 0–149)
TRIGL SERPL-MCNC: 279 MG/DL (ref 0–149)
VLDL: 56 MG/DL (ref 0–40)

## 2024-11-09 PROCEDURE — 36415 COLL VENOUS BLD VENIPUNCTURE: CPT

## 2024-11-09 PROCEDURE — 80061 LIPID PANEL: CPT

## 2024-11-09 NOTE — LETTER
November 11, 2024     Svetlana Mercedes  [address] [facility] [address]      Dear Ms. Daren:    Below are the results from your recent visit:  Cholesterol is good     Resulted Orders   Lipid Panel   Result Value Ref Range    Cholesterol 168 0 - 199 mg/dL      Comment:            Age      Desirable   Borderline High   High     0-19 Y     0 - 169       170 - 199     >/= 200    20-24 Y     0 - 189       190 - 224     >/= 225         >24 Y     0 - 199       200 - 239     >/= 240   **All ranges are based on fasting samples. Specific   therapeutic targets will vary based on patient-specific   cardiac risk.    Pediatric guidelines reference:Pediatrics 2011, 128(S5).Adult guidelines reference: NCEP ATPIII Guidelines,TJ 2001, 258:2486-97    Venipuncture immediately after or during the administration of Metamizole may lead to falsely low results. Testing should be performed immediately prior to Metamizole dosing.    HDL-Cholesterol 37.1 mg/dL      Comment:        Age       Very Low   Low     Normal    High    0-19 Y    < 35      < 40     40-45     ----  20-24 Y    ----     < 40      >45      ----        >24 Y      ----     < 40     40-60      >60      Cholesterol/HDL Ratio 4.5       Comment:        Ref Values  Desirable  < 3.4  High Risk  > 5.0    LDL Calculated 75 <=99 mg/dL      Comment:                                  Near   Borderline      AGE      Desirable  Optimal    High     High     Very High     0-19 Y     0 - 109     ---    110-129   >/= 130     ----    20-24 Y     0 - 119     ---    120-159   >/= 160     ----      >24 Y     0 -  99   100-129  130-159   160-189     >/=190      VLDL 56 (H) 0 - 40 mg/dL    Triglycerides 279 (H) 0 - 149 mg/dL      Comment:      Age              Desirable        Borderline         High        Very High  SEX:B           mg/dL             mg/dL               mg/dL      mg/dL  <=14D                       ----               ----        ----  15D-365D                     ----               ----        ----  1Y-9Y           0-74               75-99             >=100       ----  10Y-19Y        0-89                            >=130       ----  20Y-24Y        0-114             115-149             >=150      ----  >= 25Y         0-149             150-199             200-499    >=500      Venipuncture immediately after or during the administration of Metamizole may lead to falsely low results. Testing should be performed immediately prior to Metamizole dosing.    Non HDL Cholesterol 131 0 - 149 mg/dL      Comment:            Age       Desirable   Borderline High   High     Very High     0-19 Y     0 - 119       120 - 144     >/= 145    >/= 160    20-24 Y     0 - 149       150 - 189     >/= 190      ----         >24 Y    30 mg/dL above LDL Cholesterol goal       The test results show that your current treatment is working. Please continue your current medication and plan.     If you have any questions or concerns, please don't hesitate to call.         Sincerely,    Dr. Daphnie Barahona and Staff

## 2024-11-11 ENCOUNTER — OFFICE VISIT (OUTPATIENT)
Dept: PRIMARY CARE | Facility: CLINIC | Age: 49
End: 2024-11-11
Payer: COMMERCIAL

## 2024-11-11 VITALS
DIASTOLIC BLOOD PRESSURE: 58 MMHG | SYSTOLIC BLOOD PRESSURE: 100 MMHG | HEART RATE: 80 BPM | OXYGEN SATURATION: 96 % | WEIGHT: 204.2 LBS | BODY MASS INDEX: 37.35 KG/M2

## 2024-11-11 DIAGNOSIS — J06.9 ACUTE URI: Primary | ICD-10-CM

## 2024-11-11 LAB
POC BINAX EXPIRATION: NORMAL
POC BINAX NOW COVID SERIAL NUMBER: NORMAL
POC SARS-COV-2 AG BINAX: NORMAL

## 2024-11-11 PROCEDURE — 87811 SARS-COV-2 COVID19 W/OPTIC: CPT | Performed by: INTERNAL MEDICINE

## 2024-11-11 PROCEDURE — 4004F PT TOBACCO SCREEN RCVD TLK: CPT | Performed by: INTERNAL MEDICINE

## 2024-11-11 PROCEDURE — 99214 OFFICE O/P EST MOD 30 MIN: CPT | Performed by: INTERNAL MEDICINE

## 2024-11-11 RX ORDER — AZITHROMYCIN 250 MG/1
TABLET, FILM COATED ORAL
Qty: 6 TABLET | Refills: 0 | Status: SHIPPED | OUTPATIENT
Start: 2024-11-11 | End: 2024-11-16

## 2024-11-11 RX ORDER — FLUTICASONE PROPIONATE 50 MCG
1 SPRAY, SUSPENSION (ML) NASAL DAILY
Qty: 16 G | Refills: 11 | Status: SHIPPED | OUTPATIENT
Start: 2024-11-11 | End: 2025-11-11

## 2024-11-11 ASSESSMENT — ENCOUNTER SYMPTOMS
SORE THROAT: 1
LOSS OF SENSATION IN FEET: 0
DEPRESSION: 0
COUGH: 1
SINUS PAIN: 1
OCCASIONAL FEELINGS OF UNSTEADINESS: 0
RHINORRHEA: 1

## 2024-11-11 ASSESSMENT — PATIENT HEALTH QUESTIONNAIRE - PHQ9
SUM OF ALL RESPONSES TO PHQ9 QUESTIONS 1 AND 2: 0
1. LITTLE INTEREST OR PLEASURE IN DOING THINGS: NOT AT ALL
2. FEELING DOWN, DEPRESSED OR HOPELESS: NOT AT ALL

## 2024-11-11 ASSESSMENT — PAIN SCALES - GENERAL: PAINLEVEL_OUTOF10: 0-NO PAIN

## 2024-11-11 NOTE — PROGRESS NOTES
Subjective   Patient ID: Svetlana Mercedes is a 48 y.o. female who presents for sinus sx.    Didn't test for covid-tested neg in office today    Remains off cigarettes    Breast cancer surg was rescheduled from today because of her illness    URI   This is a new problem. Episode onset: 4 days ago. The problem has been gradually worsening. There has been no fever. Associated symptoms include coughing (a little), ear pain, rhinorrhea, sinus pain and a sore throat (scratchy and dry). Associated symptoms comments: No chills, fatigue.        Review of Systems   HENT:  Positive for ear pain, rhinorrhea, sinus pain and sore throat (scratchy and dry).    Respiratory:  Positive for cough (a little).        Objective   /58 (BP Location: Left arm, Patient Position: Sitting)   Pulse 80   Wt 92.6 kg (204 lb 3.2 oz)   SpO2 96%   BMI 37.35 kg/m²     Physical Exam  Constitutional:       General: She is not in acute distress.     Appearance: She is not ill-appearing.      Comments: Tired more than ill   HENT:      Ears:      Comments: Fluid behind TM no redness     Nose: Rhinorrhea present.      Comments: No sinus tenderness     Mouth/Throat:      Pharynx: Oropharynx is clear.   Cardiovascular:      Rate and Rhythm: Normal rate and regular rhythm.   Pulmonary:      Effort: Pulmonary effort is normal.      Breath sounds: Normal breath sounds. No wheezing or rhonchi.   Lymphadenopathy:      Cervical: Cervical adenopathy present.         Assessment/Plan   Assessment & Plan  Acute URI    Orders:    POCT BinaxNOW Covid-19 Ag Card manually resulted    azithromycin (Zithromax) 250 mg tablet; Take 2 tablets (500 mg) by mouth once daily for 1 day, THEN 1 tablet (250 mg) once daily for 4 days. Take 2 tabs (500 mg) by mouth today, than 1 daily for 4 days..    fluticasone (Flonase) 50 mcg/actuation nasal spray; Administer 1 spray into each nostril once daily. Shake gently. Before first use, prime pump. After use, clean tip and replace  cap.    Will treat because of need to resolve for surg-advised to call if not improving

## 2024-11-25 ENCOUNTER — APPOINTMENT (OUTPATIENT)
Dept: PRIMARY CARE | Facility: CLINIC | Age: 49
End: 2024-11-25
Payer: COMMERCIAL

## 2024-12-16 ENCOUNTER — OFFICE VISIT (OUTPATIENT)
Dept: PRIMARY CARE | Facility: CLINIC | Age: 49
End: 2024-12-16
Payer: COMMERCIAL

## 2024-12-16 VITALS
BODY MASS INDEX: 37.54 KG/M2 | DIASTOLIC BLOOD PRESSURE: 68 MMHG | OXYGEN SATURATION: 98 % | SYSTOLIC BLOOD PRESSURE: 108 MMHG | HEIGHT: 62 IN | HEART RATE: 92 BPM | WEIGHT: 204 LBS

## 2024-12-16 DIAGNOSIS — R73.9 HYPERGLYCEMIA: Primary | ICD-10-CM

## 2024-12-16 PROBLEM — D05.12 BREAST NEOPLASM, TIS (DCIS), LEFT: Status: ACTIVE | Noted: 2024-10-04

## 2024-12-16 PROBLEM — R73.03 PREDIABETES: Status: ACTIVE | Noted: 2024-11-01

## 2024-12-16 PROCEDURE — 3008F BODY MASS INDEX DOCD: CPT | Performed by: INTERNAL MEDICINE

## 2024-12-16 PROCEDURE — 99214 OFFICE O/P EST MOD 30 MIN: CPT | Performed by: INTERNAL MEDICINE

## 2024-12-16 PROCEDURE — 4004F PT TOBACCO SCREEN RCVD TLK: CPT | Performed by: INTERNAL MEDICINE

## 2024-12-16 RX ORDER — FLUCONAZOLE 150 MG/1
300 TABLET ORAL WEEKLY
COMMUNITY
Start: 2024-12-03

## 2024-12-16 RX ORDER — ANASTROZOLE 1 MG/1
1 TABLET ORAL
COMMUNITY
Start: 2024-12-02 | End: 2025-12-02

## 2024-12-16 RX ORDER — CALCIUM CARBONATE/VITAMIN D3 600 MG-20
2 TABLET,CHEWABLE ORAL
COMMUNITY
Start: 2024-12-02 | End: 2025-12-02

## 2024-12-16 RX ORDER — DOXYCYCLINE 100 MG/1
100 CAPSULE ORAL 2 TIMES DAILY
COMMUNITY
Start: 2024-12-03

## 2024-12-16 ASSESSMENT — ENCOUNTER SYMPTOMS
LOSS OF SENSATION IN FEET: 0
OCCASIONAL FEELINGS OF UNSTEADINESS: 0
DEPRESSION: 0

## 2024-12-16 ASSESSMENT — COLUMBIA-SUICIDE SEVERITY RATING SCALE - C-SSRS
2. HAVE YOU ACTUALLY HAD ANY THOUGHTS OF KILLING YOURSELF?: NO
6. HAVE YOU EVER DONE ANYTHING, STARTED TO DO ANYTHING, OR PREPARED TO DO ANYTHING TO END YOUR LIFE?: NO
1. IN THE PAST MONTH, HAVE YOU WISHED YOU WERE DEAD OR WISHED YOU COULD GO TO SLEEP AND NOT WAKE UP?: NO

## 2024-12-16 ASSESSMENT — PAIN SCALES - GENERAL: PAINLEVEL_OUTOF10: 0-NO PAIN

## 2024-12-16 NOTE — PROGRESS NOTES
"Subjective   Patient ID: Svetlana Mercedes is a 48 y.o. female who presents for Discuss medication.    Tried  optivia diet x 3 and it didn't work-pre-packaged meals but always hungry. Wasn't supposed to exercise but moves all day at work.  Has avoided sugar and fried foods but can never keep with it. Interested in trying meds to help her lose weight    Exercise-limited by pain in multiple joints    Hyperglycemia-sugars climb when has cortisone shots but other times can fall below 70. Symptomatic-shakey when sugars drop-resolves with eating. Lab Results       Component                Value               Date                       HGBA1C                   6.4 (H)             2024                        Objective   /68   Pulse 92   Ht 1.575 m (5' 2\")   Wt 92.5 kg (204 lb)   SpO2 98%   BMI 37.31 kg/m²         Assessment/Plan   Assessment & Plan  Hyperglycemia    Orders:    semaglutide 0.25 mg or 0.5 mg (2 mg/3 mL) pen injector; Inject 0.25 mg under the skin 1 (one) time per week.    Extensive discussion of dosing and slow increase in dose; discussed diet of low fat and smaller meals. Also advised need for daily exercise       "

## 2024-12-17 NOTE — ASSESSMENT & PLAN NOTE
Orders:    semaglutide 0.25 mg or 0.5 mg (2 mg/3 mL) pen injector; Inject 0.25 mg under the skin 1 (one) time per week.

## 2024-12-26 DIAGNOSIS — R73.9 HYPERGLYCEMIA: ICD-10-CM

## 2024-12-26 DIAGNOSIS — R63.5 ABNORMAL WEIGHT GAIN: ICD-10-CM

## 2024-12-26 NOTE — TELEPHONE ENCOUNTER
PATIENT WAS INTERESTED IN OZEMPIC, IT WAS DENIED, SHE IS NOT DIABETIC. SHE WOULD NOW LIKE A PRESCRIPTION FOR WEGOVY .

## 2025-01-20 ENCOUNTER — APPOINTMENT (OUTPATIENT)
Dept: PRIMARY CARE | Facility: CLINIC | Age: 50
End: 2025-01-20
Payer: COMMERCIAL

## 2025-01-20 DIAGNOSIS — E78.2 MIXED HYPERLIPIDEMIA: ICD-10-CM

## 2025-01-20 RX ORDER — ATORVASTATIN CALCIUM 20 MG/1
20 TABLET, FILM COATED ORAL EVERY 24 HOURS
Qty: 90 TABLET | Refills: 3 | Status: SHIPPED | OUTPATIENT
Start: 2025-01-20